# Patient Record
Sex: FEMALE | Race: WHITE | Employment: OTHER | URBAN - METROPOLITAN AREA
[De-identification: names, ages, dates, MRNs, and addresses within clinical notes are randomized per-mention and may not be internally consistent; named-entity substitution may affect disease eponyms.]

---

## 2017-01-26 ENCOUNTER — OFFICE VISIT (OUTPATIENT)
Dept: FAMILY MEDICINE | Facility: CLINIC | Age: 27
End: 2017-01-26
Payer: COMMERCIAL

## 2017-01-26 VITALS
BODY MASS INDEX: 23.56 KG/M2 | SYSTOLIC BLOOD PRESSURE: 105 MMHG | DIASTOLIC BLOOD PRESSURE: 73 MMHG | OXYGEN SATURATION: 99 % | HEART RATE: 87 BPM | HEIGHT: 64 IN | TEMPERATURE: 98.4 F | WEIGHT: 138 LBS

## 2017-01-26 DIAGNOSIS — N92.6 IRREGULAR MENSES: Primary | ICD-10-CM

## 2017-01-26 LAB
ERYTHROCYTE [DISTWIDTH] IN BLOOD BY AUTOMATED COUNT: 12.7 % (ref 10–15)
HCT VFR BLD AUTO: 37 % (ref 35–47)
HGB BLD-MCNC: 12.4 G/DL (ref 11.7–15.7)
MCH RBC QN AUTO: 28.8 PG (ref 26.5–33)
MCHC RBC AUTO-ENTMCNC: 33.5 G/DL (ref 31.5–36.5)
MCV RBC AUTO: 86 FL (ref 78–100)
PLATELET # BLD AUTO: 219 10E9/L (ref 150–450)
RBC # BLD AUTO: 4.3 10E12/L (ref 3.8–5.2)
WBC # BLD AUTO: 6.5 10E9/L (ref 4–11)

## 2017-01-26 PROCEDURE — 82627 DEHYDROEPIANDROSTERONE: CPT | Performed by: INTERNAL MEDICINE

## 2017-01-26 PROCEDURE — 99203 OFFICE O/P NEW LOW 30 MIN: CPT | Performed by: INTERNAL MEDICINE

## 2017-01-26 PROCEDURE — 84270 ASSAY OF SEX HORMONE GLOBUL: CPT | Performed by: INTERNAL MEDICINE

## 2017-01-26 PROCEDURE — 83002 ASSAY OF GONADOTROPIN (LH): CPT | Performed by: INTERNAL MEDICINE

## 2017-01-26 PROCEDURE — 99000 SPECIMEN HANDLING OFFICE-LAB: CPT | Performed by: INTERNAL MEDICINE

## 2017-01-26 PROCEDURE — 85027 COMPLETE CBC AUTOMATED: CPT | Performed by: INTERNAL MEDICINE

## 2017-01-26 PROCEDURE — 83001 ASSAY OF GONADOTROPIN (FSH): CPT | Performed by: INTERNAL MEDICINE

## 2017-01-26 PROCEDURE — 36415 COLL VENOUS BLD VENIPUNCTURE: CPT | Performed by: INTERNAL MEDICINE

## 2017-01-26 PROCEDURE — 84443 ASSAY THYROID STIM HORMONE: CPT | Performed by: INTERNAL MEDICINE

## 2017-01-26 PROCEDURE — 83498 ASY HYDROXYPROGESTERONE 17-D: CPT | Performed by: INTERNAL MEDICINE

## 2017-01-26 PROCEDURE — 84146 ASSAY OF PROLACTIN: CPT | Performed by: INTERNAL MEDICINE

## 2017-01-26 PROCEDURE — 84403 ASSAY OF TOTAL TESTOSTERONE: CPT | Performed by: INTERNAL MEDICINE

## 2017-01-26 PROCEDURE — 82157 ASSAY OF ANDROSTENEDIONE: CPT | Mod: 90 | Performed by: INTERNAL MEDICINE

## 2017-01-26 NOTE — PROGRESS NOTES
"  SUBJECTIVE:                                                    Sandra Calderon is a 26 year old female who presents to clinic today for the following health issues:      Vaginal Bleeding (Dysmenorrhea)      Onset: 1/19    Description:  Duration of bleeding episodes: patient started her LMP 1/14 -1/19 but since then has been spotting    Frequency between periods:  1/14/1/26   Describe bleeding/flow:   Clots: no   Number of pads/hour: 3 pad per day   Cramping: None     Intensity:  mild    Accompanying signs and symptoms: patient is concerned with dark spotting     History (similar episodes/previous evaluation): None    Precipitating or alleviating factors: None    Therapies tried and outcome: None     Patient started her LMP 1/14 and ended 1/19. But she is concerned because she had been spotting what she describes as dark/black blood from 1/19-26    Sandra has always had irregular periods.  Her last one prior to this most recent one was in August.  This most recent LMP started on 1/14 and ended on 1/19, but she's continued to have intermittent spotting of dark blood since then.  She had a tubal ligation after her second child who is now a toddler.  She has no abdominal pain or cramping, no LH, no chest pain, denies changes in appetite, mood, skin, bowel habits.  Did not have gestational diabetes with either of her two pregnancies.      Sandra is otherwise healthy.  She lives with her  and two children, moved from [place] 5 months ago.  She has no chronic medical problems and takes no regular medications.           Problem list and histories reviewed & adjusted, as indicated.      ROS:  Constitutional, cardiovascular, pulmonary, gi, gu, and endo systems are negative, except as otherwise noted.    OBJECTIVE:                                                    /73 mmHg  Pulse 87  Temp(Src) 98.4  F (36.9  C) (Tympanic)  Ht 5' 4\" (1.626 m)  Wt 138 lb (62.596 kg)  BMI 23.68 kg/m2  SpO2 99%  LMP " 01/14/2017  Body mass index is 23.68 kg/(m^2).    Gen: anxious appearing, pleasant young woman, no distress  HENT: + hair along upper lip, no acne noted  Pulm: breathing comfortably, CTAB, no wheezes or rales  CV: RRR, normal S1 and S2, no murmurs  Abd: BS present, soft, nontender, nondistended  Ext: 2+ distal pulses, no LE edema       Diagnostic Test Results:  CBC - wnl     ASSESSMENT/PLAN:                                                        1. Irregular menses  Likely PCOS given oligomenorrhea and mild hirsutism. Patient is very concerned about stopping her spotting, but seems wary of hormonal medications.  There does seem to be a language barrier.  I reassured her that the bleeding sounds minimal and her blood counts are normal, let's keep an eye on it for now.  Will await remaining labs and ultrasound results.   - US Pelvic Complete w Transvaginal; Future  - CBC with platelets  - Lutropin  - TSH  - DHEA sulfate  - Androstenedione  - 17 OH progesterone  - Testosterone Free and Total  - Follicle stimulating hormone  - Prolactin    Follow up in 1-2 weeks after ultrasound is done to review results and follow up on bleeding.     Aleena Andersen MD  [facility] [place]

## 2017-01-26 NOTE — NURSING NOTE
"Chief Complaint   Patient presents with     Abnormal Bleeding Problem       Initial /73 mmHg  Pulse 87  Temp(Src) 98.4  F (36.9  C) (Tympanic)  Ht 5' 4\" (1.626 m)  Wt 138 lb (62.596 kg)  BMI 23.68 kg/m2  SpO2 99%  LMP 01/14/2017 Estimated body mass index is 23.68 kg/(m^2) as calculated from the following:    Height as of this encounter: 5' 4\" (1.626 m).    Weight as of this encounter: 138 lb (62.596 kg).  BP completed using cuff size: regular  "

## 2017-01-27 LAB
FSH SERPL-ACNC: 9.3 IU/L
LH SERPL-ACNC: 16.2 IU/L
PROLACTIN SERPL-MCNC: 4 UG/L (ref 3–27)
TSH SERPL DL<=0.05 MIU/L-ACNC: 2.91 MU/L (ref 0.4–4)

## 2017-01-28 LAB — DHEA-S SERPL-MCNC: 191 UG/DL (ref 35–430)

## 2017-01-29 LAB — ANDROST SERPL-MCNC: 1.49 NG/ML

## 2017-01-31 ENCOUNTER — DOCUMENTATION ONLY (OUTPATIENT)
Dept: FAMILY MEDICINE | Facility: CLINIC | Age: 27
End: 2017-01-31

## 2017-01-31 LAB
SHBG SERPL-SCNC: 23 NMOL/L (ref 30–135)
TESTOST FREE SERPL-MCNC: 0.41 NG/DL (ref 0.08–0.74)
TESTOST SERPL-MCNC: 19 NG/DL (ref 8–60)

## 2017-01-31 NOTE — PROGRESS NOTES
Patient's spouse called and [facility] hadn't received the US order  TC faxed order to [facility]  [phone]  and let them know that orders are not being received electronically  [facility]

## 2017-02-01 LAB — 17OHP SERPL-MCNC: 45 NG/DL

## 2017-02-02 ENCOUNTER — TRANSFERRED RECORDS (OUTPATIENT)
Dept: HEALTH INFORMATION MANAGEMENT | Facility: CLINIC | Age: 27
End: 2017-02-02

## 2017-02-06 ENCOUNTER — TELEPHONE (OUTPATIENT)
Dept: FAMILY MEDICINE | Facility: CLINIC | Age: 27
End: 2017-02-06

## 2017-02-07 NOTE — TELEPHONE ENCOUNTER
Ultrasound showed simple benign cystic mass in right adnexa, likely hydrosalpinx. Ovaries show multiple subcentimeter follicular cysts, but not enlarged and not consistent with sonographic diagnosis of PCOS.     I spoke with Sandra over the phone and relayed the imaging findings as well as normal lab results from the other week.  Her spotting has stopped, but she is interested in medication to help regulate menses.  She will call and make an appointment with me.     Aleena Andersen MD

## 2017-02-13 ENCOUNTER — OFFICE VISIT (OUTPATIENT)
Dept: FAMILY MEDICINE | Facility: CLINIC | Age: 27
End: 2017-02-13
Payer: COMMERCIAL

## 2017-02-13 VITALS
HEART RATE: 76 BPM | SYSTOLIC BLOOD PRESSURE: 90 MMHG | BODY MASS INDEX: 24.03 KG/M2 | TEMPERATURE: 98 F | WEIGHT: 140 LBS | DIASTOLIC BLOOD PRESSURE: 50 MMHG

## 2017-02-13 DIAGNOSIS — E28.2 PCOS (POLYCYSTIC OVARIAN SYNDROME): Primary | ICD-10-CM

## 2017-02-13 PROCEDURE — 99213 OFFICE O/P EST LOW 20 MIN: CPT | Performed by: INTERNAL MEDICINE

## 2017-02-13 NOTE — NURSING NOTE
"Chief Complaint   Patient presents with     Results       Initial BP 90/50 (BP Location: Right arm, Patient Position: Chair, Cuff Size: Adult Regular)  Pulse 76  Temp 98  F (36.7  C) (Tympanic)  Wt 140 lb (63.5 kg)  LMP 01/14/2017  BMI 24.03 kg/m2 Estimated body mass index is 24.03 kg/(m^2) as calculated from the following:    Height as of 1/26/17: 5' 4\" (1.626 m).    Weight as of this encounter: 140 lb (63.5 kg).  Medication Reconciliation: complete  "

## 2017-02-13 NOTE — MR AVS SNAPSHOT
"              After Visit Summary   2/13/2017    Sandra Calderon    MRN: [id number]           Patient Information     Date Of Birth          1990        Visit Information        Provider Department      2/13/2017 8:20 AM Aleena Andersen MD [facility] [place]        Today's Diagnoses     PCOS (polycystic ovarian syndrome)    -  1      Care Instructions    You can get a fasting blood sugar test whenever is convenient - make a lab only appointment.             Follow-ups after your visit        Future tests that were ordered for you today     Open Future Orders        Priority Expected Expires Ordered    Glucose Routine  2/13/2018 2/13/2017            Who to contact     If you have questions or need follow up information about today's clinic visit or your schedule please contact [facility] [place] directly at [phone].  Normal or non-critical lab and imaging results will be communicated to you by MyChart, letter or phone within 4 business days after the clinic has received the results. If you do not hear from us within 7 days, please contact the clinic through MyChart or phone. If you have a critical or abnormal lab result, we will notify you by phone as soon as possible.  Submit refill requests through [employer] or call your pharmacy and they will forward the refill request to us. Please allow 3 business days for your refill to be completed.          Additional Information About Your Visit        MyChart Information     [employer]eves lets you send messages to your doctor, view your test results, renew your prescriptions, schedule appointments and more. To sign up, go to [email or web address] . Click on \"Log in\" on the left side of the screen, which will take you to the Welcome page. Then click on \"Sign up Now\" on the right side of the page.     You will be asked to enter the access code listed below, as well as some personal information. Please follow the directions to create your " username and password.     Your access code is: [email or web address]  Expires: 2017  8:50 AM     Your access code will  in 90 days. If you need help or a new code, please call your [facility] or [phone].        Care EveryWhere ID     This is your Care EveryWhere ID. This could be used by other organizations to access your [facility] medical records  [id number]        Your Vitals Were     Pulse Temperature Last Period BMI (Body Mass Index)          76 98  F (36.7  C) (Tympanic) 2017 24.03 kg/m2         Blood Pressure from Last 3 Encounters:   17 90/50   17 105/73    Weight from Last 3 Encounters:   17 140 lb (63.5 kg)   17 138 lb (62.6 kg)               Primary Care Provider    None Specified       No primary provider on file.        Thank you!     Thank you for choosing [facility] [place]  for your care. Our goal is always to provide you with excellent care. Hearing back from our patients is one way we can continue to improve our services. Please take a few minutes to complete the written survey that you may receive in the mail after your visit with us. Thank you!             Your Updated Medication List - Protect others around you: Learn how to safely use, store and throw away your medicines at www.disposemymeds.org.      Notice  As of 2017  8:50 AM    You have not been prescribed any medications.

## 2017-02-13 NOTE — PROGRESS NOTES
SUBJECTIVE:                                                    Sandra Calderon is a 26 year old female who presents to clinic today for the following health issues:      Concern - Pt is here to go over results from [facility]   Ultrasound showed several cysts bilaterally but did not meet sonographic criteria for PCOS  Also all lab results are wnl - including TSH, prolactin, FSH and LH, testosterone, DHEA, and androstenedione   Her spotting has stopped.   Initially sounded interested in birth control pills to help with period regulation, but under the impression that taking for 1-2 months would kick start regular periods         ROS:  Jihan, endo,  reviewed, as noted above.     OBJECTIVE:                                                    BP 90/50 (BP Location: Right arm, Patient Position: Chair, Cuff Size: Adult Regular)  Pulse 76  Temp 98  F (36.7  C) (Tympanic)  Wt 140 lb (63.5 kg)  LMP 01/14/2017  BMI 24.03 kg/m2  Body mass index is 24.03 kg/(m^2).    Gen: pleasant, well appearing woman, no distress  HENT: fine dark hair along upper lip and chin          ASSESSMENT/PLAN:                                                      1. PCOS (polycystic ovarian syndrome)  Clinically she meets criteria for PCOS, even though labs and imaging are unremarkable.  Discussed that PCOS makes it difficult to get pregnant and sometimes insulin resistance.  She already had tubal ligation, so hormonal birth control would strictly be patient preference if she wanted periods to be more regular.  At this point she does not want that - did not understand she would need to take it long term.  And that is very reasonable.  Will get a fasting glucose in the future when convenient for her.   - Glucose; Future    F/U as needed for persistent or worsening symptoms.       Aleena Andersen MD  [facility] [place]

## 2020-03-02 ENCOUNTER — OFFICE VISIT (OUTPATIENT)
Dept: FAMILY MEDICINE | Facility: CLINIC | Age: 30
End: 2020-03-02
Payer: COMMERCIAL

## 2020-03-02 VITALS
WEIGHT: 149.6 LBS | SYSTOLIC BLOOD PRESSURE: 96 MMHG | HEART RATE: 68 BPM | BODY MASS INDEX: 25.54 KG/M2 | HEIGHT: 64 IN | OXYGEN SATURATION: 98 % | TEMPERATURE: 98.4 F | DIASTOLIC BLOOD PRESSURE: 61 MMHG

## 2020-03-02 DIAGNOSIS — Z00.00 ROUTINE GENERAL MEDICAL EXAMINATION AT A HEALTH CARE FACILITY: Primary | ICD-10-CM

## 2020-03-02 PROCEDURE — 90471 IMMUNIZATION ADMIN: CPT | Performed by: FAMILY MEDICINE

## 2020-03-02 PROCEDURE — G0145 SCR C/V CYTO,THINLAYER,RESCR: HCPCS | Performed by: FAMILY MEDICINE

## 2020-03-02 PROCEDURE — 99385 PREV VISIT NEW AGE 18-39: CPT | Mod: 25 | Performed by: FAMILY MEDICINE

## 2020-03-02 PROCEDURE — 90715 TDAP VACCINE 7 YRS/> IM: CPT | Performed by: FAMILY MEDICINE

## 2020-03-02 RX ORDER — INFLUENZA A VIRUS A/NEBRASKA/14/2019 (H1N1) ANTIGEN (MDCK CELL DERIVED, PROPIOLACTONE INACTIVATED), INFLUENZA A VIRUS A/DELAWARE/39/2019 (H3N2) ANTIGEN (MDCK CELL DERIVED, PROPIOLACTONE INACTIVATED), INFLUENZA B VIRUS B/SINGAPORE/INFTT-16-0610/2016 ANTIGEN (MDCK CELL DERIVED, PROPIOLACTONE INACTIVATED), INFLUENZA B VIRUS B/DARWIN/7/2019 ANTIGEN (MDCK CELL DERIVED, PROPIOLACTONE INACTIVATED) 15; 15; 15; 15 UG/.5ML; UG/.5ML; UG/.5ML; UG/.5ML
INJECTION, SUSPENSION INTRAMUSCULAR
COMMUNITY
Start: 2019-10-13 | End: 2020-03-02

## 2020-03-02 ASSESSMENT — MIFFLIN-ST. JEOR: SCORE: 1393.58

## 2020-03-02 NOTE — PROGRESS NOTES
SUBJECTIVE:   CC: Sandra Calderon is an 29 year old woman who presents for preventive health visit.     Healthy Habits:    Do you get at least three servings of calcium containing foods daily (dairy, green leafy vegetables, etc.)? yes and no, taking calcium and/or vitamin D supplement: no    Amount of exercise or daily activities, outside of work: 45 minutes (s) per day    Problems taking medications regularly No applicable     Medication side effects: No    Have you had an eye exam in the past two years? yes    Do you see a dentist twice per year? yes    Do you have sleep apnea, excessive snoring or daytime drowsiness?yes some times          Today's PHQ-2 Score: No flowsheet data found.    Abuse: Current or Past(Physical, Sexual or Emotional)- No  Do you feel safe in your environment? Yes        Social History     Tobacco Use     Smoking status: Never Smoker   Substance Use Topics     Alcohol use: No     Alcohol/week: 0.0 standard drinks     If you drink alcohol do you typically have >3 drinks per day or >7 drinks per week? No                     Reviewed orders with patient.  Reviewed health maintenance and updated orders accordingly - No  There is no problem list on file for this patient.    Past Surgical History:   Procedure Laterality Date      SECTION       TUBAL LIGATION         Social History     Tobacco Use     Smoking status: Never Smoker     Smokeless tobacco: Never Used   Substance Use Topics     Alcohol use: No     Alcohol/week: 0.0 standard drinks     Family History   Problem Relation Age of Onset     Family History Negative Other          No current outpatient medications on file.     Allergies   Allergen Reactions     No Clinical Screening - See Comments Hives     Patient is from [place] and says she is allergic to pain medication. Type of med unknown.        Mammogram not appropriate for this patient based on age.    Pertinent mammograms are reviewed under the imaging tab.  History  "of abnormal Pap smear: NO - age 21-29 PAP every 3 years recommended     Reviewed and updated as needed this visit by clinical staff  Allergies         Reviewed and updated as needed this visit by Provider            ROS:  CONSTITUTIONAL: NEGATIVE for fever, chills, change in weight  INTEGUMENTARU/SKIN: NEGATIVE for worrisome rashes, moles or lesions  EYES: NEGATIVE for vision changes or irritation  ENT: NEGATIVE for ear, mouth and throat problems  RESP: NEGATIVE for significant cough or SOB  BREAST: NEGATIVE for masses, tenderness or discharge  CV: NEGATIVE for chest pain, palpitations or peripheral edema  GI: NEGATIVE for nausea, abdominal pain, heartburn, or change in bowel habits  : NEGATIVE for unusual urinary or vaginal symptoms. Periods are regular.  MUSCULOSKELETAL: NEGATIVE for significant arthralgias or myalgia  NEURO: NEGATIVE for weakness, dizziness or paresthesias  PSYCHIATRIC: NEGATIVE for changes in mood or affect    OBJECTIVE:   BP 96/61   Pulse 68   Temp 98.4  F (36.9  C) (Tympanic)   Ht 1.626 m (5' 4\")   Wt 67.9 kg (149 lb 9.6 oz)   SpO2 98%   BMI 25.68 kg/m     EXAM:  GENERAL: healthy, alert and no distress  EYES: Eyes grossly normal to inspection, PERRL and conjunctivae and sclerae normal  HENT: ear canals and TM's normal, nose and mouth without ulcers or lesions  NECK: no adenopathy, no asymmetry, masses, or scars and thyroid normal to palpation  RESP: lungs clear to auscultation - no rales, rhonchi or wheezes  BREAST: normal without masses, tenderness or nipple discharge and no palpable axillary masses or adenopathy  CV: regular rate and rhythm, normal S1 S2, no S3 or S4, no murmur, click or rub, no peripheral edema and peripheral pulses strong  ABDOMEN: soft, nontender, no hepatosplenomegaly, no masses and bowel sounds normal   (female): normal female external genitalia, normal urethral meatus, vaginal mucosa pink, moist, well rugated, and normal cervix/adnexa/uterus without masses " "or discharge  MS: no gross musculoskeletal defects noted, no edema  SKIN: no suspicious lesions or rashes  NEURO: Normal strength and tone, mentation intact and speech normal  PSYCH: mentation appears normal, affect normal/bright        ASSESSMENT/PLAN:   1. Routine general medical examination at a health care facility  Screening Pap smear ordered.  Fasting labs ordered.  Patient will come back another day to get the blood work done.  - Pap imaged thin layer screen reflex to HPV if ASCUS - recommend age 25 - 29  - HIV Antigen Antibody Combo; Future  - Hemoglobin; Future  - TSH with free T4 reflex; Future  - Lipid panel reflex to direct LDL Fasting; Future  - Glucose; Future    COUNSELING:   Reviewed preventive health counseling, as reflected in patient instructions       Regular exercise       Healthy diet/nutrition    Estimated body mass index is 25.68 kg/m  as calculated from the following:    Height as of this encounter: 1.626 m (5' 4\").    Weight as of this encounter: 67.9 kg (149 lb 9.6 oz).    Weight management plan: Discussed healthy diet and exercise guidelines     reports that she has never smoked. She does not have any smokeless tobacco history on file.      Counseling Resources:  ATP IV Guidelines  Pooled Cohorts Equation Calculator  Breast Cancer Risk Calculator  FRAX Risk Assessment  ICSI Preventive Guidelines  Dietary Guidelines for Americans, 2010  USDA's MyPlate  ASA Prophylaxis  Lung CA Screening    Sedrick Tejeda MD  [facility] [place]  "

## 2020-03-04 DIAGNOSIS — Z00.00 ROUTINE GENERAL MEDICAL EXAMINATION AT A HEALTH CARE FACILITY: ICD-10-CM

## 2020-03-04 LAB
CHOLEST SERPL-MCNC: 172 MG/DL
COPATH REPORT: NORMAL
GLUCOSE SERPL-MCNC: 90 MG/DL (ref 70–99)
HDLC SERPL-MCNC: 42 MG/DL
HGB BLD-MCNC: 10.7 G/DL (ref 11.7–15.7)
HIV 1+2 AB+HIV1 P24 AG SERPL QL IA: NONREACTIVE
LDLC SERPL CALC-MCNC: 117 MG/DL
NONHDLC SERPL-MCNC: 130 MG/DL
PAP: NORMAL
TRIGL SERPL-MCNC: 67 MG/DL
TSH SERPL DL<=0.005 MIU/L-ACNC: 2.7 MU/L (ref 0.4–4)

## 2020-03-04 PROCEDURE — 84443 ASSAY THYROID STIM HORMONE: CPT | Performed by: FAMILY MEDICINE

## 2020-03-04 PROCEDURE — 80061 LIPID PANEL: CPT | Performed by: FAMILY MEDICINE

## 2020-03-04 PROCEDURE — 87389 HIV-1 AG W/HIV-1&-2 AB AG IA: CPT | Performed by: FAMILY MEDICINE

## 2020-03-04 PROCEDURE — 36415 COLL VENOUS BLD VENIPUNCTURE: CPT | Performed by: FAMILY MEDICINE

## 2020-03-04 PROCEDURE — 85018 HEMOGLOBIN: CPT | Performed by: FAMILY MEDICINE

## 2020-03-04 PROCEDURE — 82947 ASSAY GLUCOSE BLOOD QUANT: CPT | Performed by: FAMILY MEDICINE

## 2020-08-18 ENCOUNTER — MYC MEDICAL ADVICE (OUTPATIENT)
Dept: FAMILY MEDICINE | Facility: CLINIC | Age: 30
End: 2020-08-18

## 2020-08-18 ENCOUNTER — NURSE TRIAGE (OUTPATIENT)
Dept: FAMILY MEDICINE | Facility: CLINIC | Age: 30
End: 2020-08-18

## 2020-08-18 NOTE — TELEPHONE ENCOUNTER
Patient reports brief episodes of tingling of her left cheek bone and around her left eye. Reports that it comes with a mild headache. States that she felt it around 10 am this morning and between 2 to 3 pm.  Denies vision change, weakness or dizziness.   No concern for pregnancy.     Patient scheduled for 2:20 tomorrow.  hesitated to schedule. Wanted to wait a couple of more days. Agreed to schedule when Dr. Tejeda was not available later this week.     Additional Information    Tingling (e.g., pins and needles) of the face, arm or leg on one side of the body, that is  present now    Protocols used: NEUROLOGIC DEFICIT-A-OH    Bhumika Newman RN

## 2020-12-27 ENCOUNTER — HEALTH MAINTENANCE LETTER (OUTPATIENT)
Age: 30
End: 2020-12-27

## 2021-04-24 ENCOUNTER — HEALTH MAINTENANCE LETTER (OUTPATIENT)
Age: 31
End: 2021-04-24

## 2021-10-09 ENCOUNTER — HEALTH MAINTENANCE LETTER (OUTPATIENT)
Age: 31
End: 2021-10-09

## 2022-05-16 ENCOUNTER — HEALTH MAINTENANCE LETTER (OUTPATIENT)
Age: 32
End: 2022-05-16

## 2022-09-11 ENCOUNTER — HEALTH MAINTENANCE LETTER (OUTPATIENT)
Age: 32
End: 2022-09-11

## 2023-06-03 ENCOUNTER — HEALTH MAINTENANCE LETTER (OUTPATIENT)
Age: 33
End: 2023-06-03